# Patient Record
Sex: FEMALE | Race: WHITE | NOT HISPANIC OR LATINO | URBAN - METROPOLITAN AREA
[De-identification: names, ages, dates, MRNs, and addresses within clinical notes are randomized per-mention and may not be internally consistent; named-entity substitution may affect disease eponyms.]

---

## 2018-05-21 ENCOUNTER — NURSE TRIAGE (OUTPATIENT)
Dept: ADMINISTRATIVE | Facility: CLINIC | Age: 53
End: 2018-05-21

## 2018-05-21 NOTE — TELEPHONE ENCOUNTER
Reason for Disposition   BP > 180/110    Protocols used: ST HIGH BLOOD PRESSURE-A-OH    Ms. Jose states her blood pressure was 175/113. Patient does not have a previous diagnosis of hypertension. She is asymptomatic. Patient does not have a PCP, advised to go to urgent care to have blood pressure evaluated today.